# Patient Record
Sex: MALE | Race: WHITE | NOT HISPANIC OR LATINO | URBAN - METROPOLITAN AREA
[De-identification: names, ages, dates, MRNs, and addresses within clinical notes are randomized per-mention and may not be internally consistent; named-entity substitution may affect disease eponyms.]

---

## 2017-11-22 ENCOUNTER — SKIN CHECK (OUTPATIENT)
Dept: URBAN - METROPOLITAN AREA CLINIC 7 | Facility: CLINIC | Age: 67
Setting detail: DERMATOLOGY
End: 2017-11-22

## 2017-11-22 DIAGNOSIS — L57.0 ACTINIC KERATOSIS: ICD-10-CM

## 2017-11-22 PROCEDURE — 17000 DESTRUCT PREMALG LESION: CPT

## 2017-11-22 PROCEDURE — 17003 DESTRUCT PREMALG LES 2-14: CPT

## 2017-11-22 PROCEDURE — 99213 OFFICE O/P EST LOW 20 MIN: CPT

## 2023-12-04 ENCOUNTER — APPOINTMENT (RX ONLY)
Dept: URBAN - METROPOLITAN AREA CLINIC 94 | Facility: CLINIC | Age: 73
Setting detail: DERMATOLOGY
End: 2023-12-04

## 2023-12-04 DIAGNOSIS — Z85.828 PERSONAL HISTORY OF OTHER MALIGNANT NEOPLASM OF SKIN: ICD-10-CM

## 2023-12-04 DIAGNOSIS — L81.4 OTHER MELANIN HYPERPIGMENTATION: ICD-10-CM

## 2023-12-04 DIAGNOSIS — D22 MELANOCYTIC NEVI: ICD-10-CM

## 2023-12-04 DIAGNOSIS — L57.0 ACTINIC KERATOSIS: ICD-10-CM

## 2023-12-04 DIAGNOSIS — L82.1 OTHER SEBORRHEIC KERATOSIS: ICD-10-CM

## 2023-12-04 PROBLEM — D22.62 MELANOCYTIC NEVI OF LEFT UPPER LIMB, INCLUDING SHOULDER: Status: ACTIVE | Noted: 2023-12-04

## 2023-12-04 PROBLEM — D22.72 MELANOCYTIC NEVI OF LEFT LOWER LIMB, INCLUDING HIP: Status: ACTIVE | Noted: 2023-12-04

## 2023-12-04 PROBLEM — D22.5 MELANOCYTIC NEVI OF TRUNK: Status: ACTIVE | Noted: 2023-12-04

## 2023-12-04 PROBLEM — D22.71 MELANOCYTIC NEVI OF RIGHT LOWER LIMB, INCLUDING HIP: Status: ACTIVE | Noted: 2023-12-04

## 2023-12-04 PROBLEM — D22.61 MELANOCYTIC NEVI OF RIGHT UPPER LIMB, INCLUDING SHOULDER: Status: ACTIVE | Noted: 2023-12-04

## 2023-12-04 PROCEDURE — 99214 OFFICE O/P EST MOD 30 MIN: CPT | Mod: 25

## 2023-12-04 PROCEDURE — ? SEPARATE AND IDENTIFIABLE DOCUMENTATION

## 2023-12-04 PROCEDURE — 17003 DESTRUCT PREMALG LES 2-14: CPT

## 2023-12-04 PROCEDURE — ? PRESCRIPTION MEDICATION MANAGEMENT

## 2023-12-04 PROCEDURE — ? LIQUID NITROGEN

## 2023-12-04 PROCEDURE — ? COUNSELING

## 2023-12-04 PROCEDURE — 17000 DESTRUCT PREMALG LESION: CPT

## 2023-12-04 ASSESSMENT — LOCATION ZONE DERM
LOCATION ZONE: TRUNK
LOCATION ZONE: FACE
LOCATION ZONE: ARM
LOCATION ZONE: SCALP
LOCATION ZONE: LEG

## 2023-12-04 ASSESSMENT — LOCATION SIMPLE DESCRIPTION DERM
LOCATION SIMPLE: LEFT PRETIBIAL REGION
LOCATION SIMPLE: LEFT UPPER BACK
LOCATION SIMPLE: LEFT THIGH
LOCATION SIMPLE: ABDOMEN
LOCATION SIMPLE: RIGHT FOREHEAD
LOCATION SIMPLE: CHEST
LOCATION SIMPLE: RIGHT THIGH
LOCATION SIMPLE: LEFT FOREHEAD
LOCATION SIMPLE: RIGHT PRETIBIAL REGION
LOCATION SIMPLE: RIGHT BACK
LOCATION SIMPLE: RIGHT FOREARM
LOCATION SIMPLE: LEFT FOREARM
LOCATION SIMPLE: RIGHT UPPER ARM
LOCATION SIMPLE: LEFT UPPER ARM
LOCATION SIMPLE: POSTERIOR SCALP
LOCATION SIMPLE: RIGHT CHEEK
LOCATION SIMPLE: LEFT CHEEK

## 2023-12-04 ASSESSMENT — LOCATION DETAILED DESCRIPTION DERM
LOCATION DETAILED: LEFT SUPERIOR FOREHEAD
LOCATION DETAILED: RIGHT ANTERIOR DISTAL THIGH
LOCATION DETAILED: RIGHT CENTRAL MALAR CHEEK
LOCATION DETAILED: RIGHT SUPERIOR LATERAL UPPER BACK
LOCATION DETAILED: RIGHT SUPERIOR FOREHEAD
LOCATION DETAILED: EPIGASTRIC SKIN
LOCATION DETAILED: RIGHT INFERIOR LATERAL FOREHEAD
LOCATION DETAILED: LOWER STERNUM
LOCATION DETAILED: LEFT SUPERIOR MEDIAL FOREHEAD
LOCATION DETAILED: LEFT ANTERIOR DISTAL THIGH
LOCATION DETAILED: LEFT FOREHEAD
LOCATION DETAILED: RIGHT ANTERIOR DISTAL UPPER ARM
LOCATION DETAILED: POSTERIOR MID-PARIETAL SCALP
LOCATION DETAILED: LEFT DISTAL DORSAL FOREARM
LOCATION DETAILED: LEFT VENTRAL PROXIMAL FOREARM
LOCATION DETAILED: LEFT SUPERIOR LATERAL UPPER BACK
LOCATION DETAILED: LEFT DISTAL PRETIBIAL REGION
LOCATION DETAILED: LEFT ANTERIOR DISTAL UPPER ARM
LOCATION DETAILED: LEFT SUPERIOR CENTRAL MALAR CHEEK
LOCATION DETAILED: LEFT PROXIMAL DORSAL FOREARM
LOCATION DETAILED: LEFT INFERIOR FOREHEAD
LOCATION DETAILED: RIGHT DISTAL PRETIBIAL REGION
LOCATION DETAILED: RIGHT VENTRAL PROXIMAL FOREARM
LOCATION DETAILED: RIGHT PROXIMAL DORSAL FOREARM

## 2023-12-04 NOTE — PROCEDURE: PRESCRIPTION MEDICATION MANAGEMENT
Render In Strict Bullet Format?: No
Initiate Treatment: Pt to start Fluorouracil cream to forehead and bilateral forearms BID x 2 weeks for forehead and 4 weeks for arms
Detail Level: Zone

## 2023-12-04 NOTE — PROCEDURE: LIQUID NITROGEN
Post-Care Instructions: Patient provided with verbal post-care instructions. Patient is to wear sun protection. Patient may apply Vaseline to crusted or scabbing areas as they heal.
Duration Of Freeze Thaw-Cycle (Seconds): 0
Render Post-Care Instructions In Note?: no
Show Aperture Variable?: Yes
Consent: The patient's consent was obtained and risks, benefits, and alternatives were reviewed.
Detail Level: Simple

## 2023-12-04 NOTE — PROCEDURE: COUNSELING
Detail Level: Zone
Detail Level: Generalized
Detail Level: Detailed
Detail Level: Simple
Patient Specific Counseling (Will Not Stick From Patient To Patient): Recommended pt to use Fluorouracil to forehead and forearms

## 2024-11-04 ENCOUNTER — APPOINTMENT (RX ONLY)
Dept: URBAN - METROPOLITAN AREA CLINIC 94 | Facility: CLINIC | Age: 74
Setting detail: DERMATOLOGY
End: 2024-11-04

## 2024-11-04 DIAGNOSIS — L81.4 OTHER MELANIN HYPERPIGMENTATION: ICD-10-CM

## 2024-11-04 DIAGNOSIS — L57.0 ACTINIC KERATOSIS: ICD-10-CM

## 2024-11-04 DIAGNOSIS — L82.1 OTHER SEBORRHEIC KERATOSIS: ICD-10-CM

## 2024-11-04 DIAGNOSIS — D22 MELANOCYTIC NEVI: ICD-10-CM

## 2024-11-04 DIAGNOSIS — D49.2 NEOPLASM OF UNSPECIFIED BEHAVIOR OF BONE, SOFT TISSUE, AND SKIN: ICD-10-CM | Status: INADEQUATELY CONTROLLED

## 2024-11-04 DIAGNOSIS — Z85.828 PERSONAL HISTORY OF OTHER MALIGNANT NEOPLASM OF SKIN: ICD-10-CM

## 2024-11-04 PROBLEM — D22.5 MELANOCYTIC NEVI OF TRUNK: Status: ACTIVE | Noted: 2024-11-04

## 2024-11-04 PROBLEM — D22.71 MELANOCYTIC NEVI OF RIGHT LOWER LIMB, INCLUDING HIP: Status: ACTIVE | Noted: 2024-11-04

## 2024-11-04 PROBLEM — D22.62 MELANOCYTIC NEVI OF LEFT UPPER LIMB, INCLUDING SHOULDER: Status: ACTIVE | Noted: 2024-11-04

## 2024-11-04 PROBLEM — D22.61 MELANOCYTIC NEVI OF RIGHT UPPER LIMB, INCLUDING SHOULDER: Status: ACTIVE | Noted: 2024-11-04

## 2024-11-04 PROBLEM — D22.72 MELANOCYTIC NEVI OF LEFT LOWER LIMB, INCLUDING HIP: Status: ACTIVE | Noted: 2024-11-04

## 2024-11-04 PROCEDURE — 69100 BIOPSY OF EXTERNAL EAR: CPT | Mod: 59

## 2024-11-04 PROCEDURE — ? BIOPSY BY SHAVE METHOD

## 2024-11-04 PROCEDURE — 11102 TANGNTL BX SKIN SINGLE LES: CPT | Mod: 59

## 2024-11-04 PROCEDURE — 17003 DESTRUCT PREMALG LES 2-14: CPT

## 2024-11-04 PROCEDURE — ? COUNSELING

## 2024-11-04 PROCEDURE — 99213 OFFICE O/P EST LOW 20 MIN: CPT | Mod: 25

## 2024-11-04 PROCEDURE — ? LIQUID NITROGEN

## 2024-11-04 PROCEDURE — 17000 DESTRUCT PREMALG LESION: CPT | Mod: 59

## 2024-11-04 ASSESSMENT — LOCATION DETAILED DESCRIPTION DERM
LOCATION DETAILED: RIGHT ANTERIOR DISTAL UPPER ARM
LOCATION DETAILED: RIGHT CENTRAL MALAR CHEEK
LOCATION DETAILED: RIGHT SUPERIOR LATERAL UPPER BACK
LOCATION DETAILED: LEFT SUPERIOR FOREHEAD
LOCATION DETAILED: LEFT CENTRAL MALAR CHEEK
LOCATION DETAILED: POSTERIOR MID-PARIETAL SCALP
LOCATION DETAILED: LEFT SUPERIOR LATERAL UPPER BACK
LOCATION DETAILED: LEFT VENTRAL PROXIMAL FOREARM
LOCATION DETAILED: RIGHT SUPERIOR FOREHEAD
LOCATION DETAILED: RIGHT SUPERIOR POSTERIOR HELIX
LOCATION DETAILED: LEFT FOREHEAD
LOCATION DETAILED: EPIGASTRIC SKIN
LOCATION DETAILED: RIGHT ANTERIOR DISTAL THIGH
LOCATION DETAILED: LEFT ANTERIOR DISTAL THIGH
LOCATION DETAILED: LOWER STERNUM
LOCATION DETAILED: RIGHT VENTRAL PROXIMAL FOREARM
LOCATION DETAILED: RIGHT DISTAL PRETIBIAL REGION
LOCATION DETAILED: LEFT DISTAL PRETIBIAL REGION
LOCATION DETAILED: LEFT ANTERIOR DISTAL UPPER ARM

## 2024-11-04 ASSESSMENT — LOCATION SIMPLE DESCRIPTION DERM
LOCATION SIMPLE: LEFT UPPER ARM
LOCATION SIMPLE: POSTERIOR SCALP
LOCATION SIMPLE: ABDOMEN
LOCATION SIMPLE: LEFT THIGH
LOCATION SIMPLE: RIGHT CHEEK
LOCATION SIMPLE: LEFT PRETIBIAL REGION
LOCATION SIMPLE: RIGHT FOREARM
LOCATION SIMPLE: LEFT CHEEK
LOCATION SIMPLE: RIGHT PRETIBIAL REGION
LOCATION SIMPLE: RIGHT UPPER ARM
LOCATION SIMPLE: RIGHT EAR
LOCATION SIMPLE: CHEST
LOCATION SIMPLE: LEFT FOREHEAD
LOCATION SIMPLE: LEFT UPPER BACK
LOCATION SIMPLE: RIGHT BACK
LOCATION SIMPLE: RIGHT THIGH
LOCATION SIMPLE: LEFT FOREARM
LOCATION SIMPLE: RIGHT FOREHEAD

## 2024-11-04 ASSESSMENT — LOCATION ZONE DERM
LOCATION ZONE: TRUNK
LOCATION ZONE: EAR
LOCATION ZONE: SCALP
LOCATION ZONE: LEG
LOCATION ZONE: FACE
LOCATION ZONE: ARM

## 2024-11-04 NOTE — PROCEDURE: LIQUID NITROGEN
Consent: The patient's consent was obtained and risks, benefits, and alternatives were reviewed.
Show Aperture Variable?: Yes
Render Post-Care Instructions In Note?: no
Duration Of Freeze Thaw-Cycle (Seconds): 0
Post-Care Instructions: Patient provided with verbal post-care instructions. Patient is to wear sun protection. Patient may apply Vaseline to crusted or scabbing areas as they heal.
Detail Level: Simple

## 2024-11-04 NOTE — PROCEDURE: BIOPSY BY SHAVE METHOD
Detail Level: Detailed
Depth Of Biopsy: dermis
Was A Bandage Applied: Yes
Size Of Lesion In Cm: 0
Biopsy Type: H and E
Biopsy Method: double edge Personna blade
Anesthesia Type: 1% lidocaine without epinephrine
Anesthesia Volume In Cc: 0.5
Hemostasis: Electrocautery and Aluminum Chloride
Wound Care: Petrolatum
Dressing: bandage
Destruction After The Procedure: No
Type Of Destruction Used: Curettage
Curettage Text: The wound bed was treated with curettage after the biopsy was performed.
Cryotherapy Text: The wound bed was treated with cryotherapy after the biopsy was performed.
Electrodesiccation Text: The wound bed was treated with electrodesiccation after the biopsy was performed.
Electrodesiccation And Curettage Text: The wound bed was treated with electrodesiccation and curettage after the biopsy was performed.
Silver Nitrate Text: The wound bed was treated with silver nitrate after the biopsy was performed.
Lab: [id number]
Consent was obtained and risks, benefits, and alternatives were reviewed.
Post-Care Instructions: Patient is to keep the biopsy covered with Vaseline/Aquaphor and cover with bandage daily until healed. Call the office if any concerns about healing process.
Notification Instructions: Patient will be notified of biopsy results.
Billing Type: Third-Party Bill
Information: Selecting Yes will display possible errors in your note based on the variables you have selected. This validation is only offered as a suggestion for you. PLEASE NOTE THAT THE VALIDATION TEXT WILL BE REMOVED WHEN YOU FINALIZE YOUR NOTE. IF YOU WANT TO FAX A PRELIMINARY NOTE YOU WILL NEED TO TOGGLE THIS TO 'NO' IF YOU DO NOT WANT IT IN YOUR FAXED NOTE.

## 2024-12-02 ENCOUNTER — APPOINTMENT (RX ONLY)
Dept: URBAN - METROPOLITAN AREA CLINIC 102 | Facility: CLINIC | Age: 74
Setting detail: DERMATOLOGY
End: 2024-12-02

## 2024-12-02 PROBLEM — C44.212 BASAL CELL CARCINOMA OF SKIN OF RIGHT EAR AND EXTERNAL AURICULAR CANAL: Status: ACTIVE | Noted: 2024-12-02

## 2024-12-02 PROCEDURE — 99214 OFFICE O/P EST MOD 30 MIN: CPT

## 2024-12-02 PROCEDURE — ? CONSULTATION FOR ELECTRON BEAM THERAPY

## 2024-12-02 PROCEDURE — ? PATIENT SPECIFIC COUNSELING

## 2024-12-02 NOTE — HPI: BIOPSY PROVEN BCC
How Severe Is Your Bcc?: moderate
When Was The Bcc Biopsied? (Optional): 11/04/2024
Accession # (Optional): [id number]

## 2024-12-02 NOTE — PROCEDURE: CONSULTATION FOR ELECTRON BEAM THERAPY
Right Lower Extremity: No
Size Of Lesion: 1.1
Referring Provider: Dali Miller MD
Other Plan: We discussed a 5 to 6 week treatment plan, Pt selected a 5 week regimen 
X Size Of Lesion In Cm (Optional): 0.9
Anatomic Location From Referring Provider: right superior posterior helix
Detail Level: Detailed

## 2024-12-19 ENCOUNTER — APPOINTMENT (OUTPATIENT)
Dept: URBAN - METROPOLITAN AREA CLINIC 102 | Facility: CLINIC | Age: 74
Setting detail: DERMATOLOGY
End: 2024-12-19

## 2024-12-19 PROBLEM — D04.71 CARCINOMA IN SITU OF SKIN OF RIGHT LOWER LIMB, INCLUDING HIP: Status: ACTIVE | Noted: 2024-12-19

## 2024-12-19 PROCEDURE — 17262 DSTRJ MAL LES T/A/L 1.1-2.0: CPT

## 2024-12-19 PROCEDURE — ? CURETTAGE AND DESTRUCTION

## 2024-12-19 NOTE — PROCEDURE: CURETTAGE AND DESTRUCTION
Body Location Override (Optional - Billing Will Still Be Based On Selected Body Map Location If Applicable): right anterior distal thigh
Detail Level: Simple
Number Of Curettages: 1
Size Of Lesion In Cm: 0.7
Size Of Lesion After Curettage: 1.4
Add Intralesional Injection: No
Anesthesia Type: 1% lidocaine without epinephrine
Anesthesia Volume In Cc: 2
Cautery Type: electrodesiccation
What Was Performed First?: Curettage
Final Size Statement: The size of the lesion after curettage was
Additional Information: (Optional): The wound was cleaned, and a pressure dressing was applied.  The patient received detailed post-op instructions.
Consent was obtained from the patient. The risks, benefits and alternatives to therapy were discussed in detail.
Render Post-Care Instructions In Note?: yes
Post-Care Instructions: Patient received post-care instructions. Patient instructed to call the office if any concerns during the healing process.
Bill As A Line Item Or As Units: Line Item

## 2025-01-06 ENCOUNTER — APPOINTMENT (OUTPATIENT)
Dept: URBAN - METROPOLITAN AREA CLINIC 102 | Facility: CLINIC | Age: 75
Setting detail: DERMATOLOGY
End: 2025-01-06

## 2025-01-06 PROBLEM — C44.212 BASAL CELL CARCINOMA OF SKIN OF RIGHT EAR AND EXTERNAL AURICULAR CANAL: Status: ACTIVE | Noted: 2025-01-06

## 2025-01-06 PROCEDURE — ? INITIAL COMPLEX SIMULATION

## 2025-01-06 PROCEDURE — 77263 THER RADIOLOGY TX PLNG CPLX: CPT

## 2025-01-06 PROCEDURE — ? CLINICAL TREATMENT PLAN FOR RADIATION THERAPY

## 2025-01-06 PROCEDURE — 77290 THER RAD SIMULAJ FIELD CPLX: CPT

## 2025-01-06 NOTE — PROCEDURE: CLINICAL TREATMENT PLAN FOR RADIATION THERAPY
Intro Statement (Will Not Render If Left Blank): Records, reports, pathology, and physical exam have been completed, interpreted and reviewed to assist in defining the course of radiation therapy treatment. Parameters interpreted include tumor histology, size, location, extent of disease and prior medical history. These factors will integrate into radiation field design. A complex electron beam simulation is necessary to accomplish a reproducible beam arrangement, field size and target volume with which to treat the tumor. Beam modifying devices will be designed and utilized as necessary to optimize the treatment and to best spare normal tissues. Complex blocking will be performed to minimize radiation scatter (penumbra), shape to target volume, and to best protect adjacent and underlying normal tissues. Fields will be verified on the patient prior to radiation delivery.
Daily Dose (Include Units): 250cGy
Pathology: Use Selected EMA Impression
Cummulative Dose (Include Units): 5500cGy
Which Code: 65801
Treatment Length (Include Units): 5 weeks
Send Cpt Codes To Pm?: Yes
Detail Level: Detailed
Dosing Schedule: 5 days a week
Position: supine
Energy In Mev: 6

## 2025-01-06 NOTE — PROCEDURE: INITIAL COMPLEX SIMULATION
Isodose: 90
Intro Statement For Treatment Plan (Will Not Render If Left Blank): Records, reports, pathology, and physical exam have been completed, interpreted and reviewed to assist in defining the course of radiation therapy treatment. Parameters interpreted include tumor histology, size, location, extent of disease and prior medical history. These factors will integrate into radiation field design. A complex electron beam simulation is necessary to accomplish a reproducible beam arrangement, field size and target volume with which to treat the tumor. Beam modifying devices will be designed and utilized as necessary to optimize the treatment and to best spare normal tissues. Complex blocking will be performed to minimize radiation scatter (penumbra), shape to target volume, and to best protect adjacent and underlying normal tissues. Fields will be verified on the patient prior to radiation delivery.
Detail Level: Detailed
Daily Dose (Include Units): 250cGy
Custom Bolus Type: custom mixed, molded, and shaped
Dosing Schedule: 5 days a week
Pathology: Use Selected EMA Impression
Eye Shield Statement (Will Not Render If Left Blank): External eye shields will be placed daily to limit scatter dose to the lenses of the eyes. Customized eye shielding is safer than reusable eye shielding.  We therefore will custom design and fabricate bilateral eye shields made of shaped lead and covered by wax, for each of our patients. These will be used only during the course of treatment and then destroyed and constitute a complex fabrication process and device.
Intro Statement (Will Not Render If Left Blank): To best access the lesion the ear was taped up superiorly over lead wax shielding. I then designed a radiation field to include the gross lesion (GTV) with additional margin that accounted for both potential subclinical microscopic extension (CTV), as well as for the beam characteristics of superficial electrons (PTV). Care was taken in designing the field near adjacent normal tissue structures. The target volume was drawn on the skin surface with a permanent marker and then the design with reference marks was carefully traced onto an acetate template. Digital photographs were taken.
Position: supine
Use Custom Eyeshields: Yes
Send Clinical Treatment Planning Code To Pm?: No - Documentation Only
Closing Statement (Will Not Render If Left Blank): The patient tolerated the simulation well and appears to have had all of their questions answered to their satisfaction. The patient will return for field verification, simple simulation before radiation is delivered to confirm patient positioning and block shaping and positioning.
Bolus Thickness In Cm: 0.5
Acetate Template Statement (Will Not Render If Left Blank): An acetate template will be used to fabricate a custom lead shielding device to allow for lead on skin collimation. This type of shielding will best limit beam penumbra. Additional shielding will also be added to protect adjacent strutures.
Energy In Mev: 6
Treatment Length (Include Units): 5 weeks
Cummulative Dose (Include Units): 5500cGy

## 2025-01-09 ENCOUNTER — APPOINTMENT (OUTPATIENT)
Dept: URBAN - METROPOLITAN AREA CLINIC 102 | Facility: CLINIC | Age: 75
Setting detail: DERMATOLOGY
End: 2025-01-09

## 2025-01-09 PROBLEM — C44.212 BASAL CELL CARCINOMA OF SKIN OF RIGHT EAR AND EXTERNAL AURICULAR CANAL: Status: ACTIVE | Noted: 2025-01-09

## 2025-01-09 PROCEDURE — ? SIMPLE SIMULATION - BLOCK CHECK

## 2025-01-09 PROCEDURE — 77280 THER RAD SIMULAJ FIELD SMPL: CPT

## 2025-01-09 NOTE — PROCEDURE: SIMPLE SIMULATION - BLOCK CHECK
Closing Statement (Will Not Render If Left Blank): Working with the physician, the therapist adjusted the machine gantry, table, and collimator and adjusted patient positioning to allow an appositional electron beam. SSD measurements were verified using the projected optical distance indicator light and room lasers. The field was positioned at 100cm SSD to the top of the bolus material. The machine parameters were recorded. The treatment light field was photographed projected onto the patient's skin. Further digital photographs were taken and will be used as a reference. The patient tolerated the simple simulation well and appears to have had all of their questions answered to their satisfaction.
Position: supine
Detail Level: Simple
Custom Bolus Type: custom mixed, molded, and shaped
Bolus Thickness In Cm: Next 0.5

## 2025-01-16 ENCOUNTER — APPOINTMENT (OUTPATIENT)
Dept: URBAN - METROPOLITAN AREA CLINIC 102 | Facility: CLINIC | Age: 75
Setting detail: DERMATOLOGY
End: 2025-01-16

## 2025-01-16 PROBLEM — C44.212 BASAL CELL CARCINOMA OF SKIN OF RIGHT EAR AND EXTERNAL AURICULAR CANAL: Status: ACTIVE | Noted: 2025-01-16

## 2025-01-16 PROCEDURE — ? ELECTRON BEAM THERAPY

## 2025-01-16 NOTE — PROCEDURE: ELECTRON BEAM THERAPY
Ebt Cpt Code (Please Add Code Details Below): 
Treatment Margins In Cm: 0
Is Therapy Completed?: No
Date Of Fraction 1: 01/16/2025
Date Of Fraction 3: 01/20/2025
Date Of Fraction 5: 01/22/2025
Radiation Units: cGy
Detail Level: Detailed
Total Planned Fractions: 22
Date Of Fraction 4: 01/21/2025
Location Override (Location Will Render As Ema Body Location If Left Blank): right superior posterior helix
Assessment: Appropriate reaction
Early, Moist Desquamation Counseling: The patient was instructed in meticulous wound care and counseled on potential and expected side effects of treatment and reasonable expectations for the time to heal. They appeared to have all of their questions answered to their satisfaction. They were recommended to cleanse the treatment site with dilute hydrogen peroxide and water (using 50:50 ratio). They were told how to apply the solution gently with a cotton ball twice daily. After cleaning, they were instructed to pat the area dry with a soft cloth and then apply a small amount of Silvadene 1% cream twice daily. They were told to return to the clinic in 7 days for re-evaluation. The patient understands to call with any questions, concerns or difficulties. They were informed of the potentital for infection and counseled on common signs and symptoms of infection including skin redness extending outside of the treatment area, enlargement or skin breakdown, significant warmth, pain, fever or chills or sweats. They voiced an understanding to contact us immediately if any of these symptoms develop. Their follow up appointment was scheduled. They were also instructed to follow-up with dermatology for skin cancer surveillance.
Mild, Moderate, Brisk Erythema Or Dry Desquamation Counseling: The patient was instructed in meticulous wound care and counseled on potential and expected side effects of treatment and reasonable expectations for the time to heal. They appeared to have all of their questions answered to their satisfaction. They were recommended to rinse the treatment site with mild soap and water (recommended Dove, Neutrogena or Cetaphil). After rinsing the treatment site twice a day they are to apply a pea-sized amount of Aquaphor healing ointment twice daily. Aquaphor samples were provided. The patient understands to call with any questions, concerns or difficulties. They were informed of the potentital for infection and counseled on common signs and symptoms of infection including skin redness extending outside of the treatment area, enlargement or skin breakdown, significant warmth, pain, fever or chills or sweats. They voiced an understanding to contact us immediately if any of these symptoms develop. Their follow up appointment was scheduled. They were also instructed to follow-up with dermatology for skin cancer surveillance.
Date Of Fraction 2: 01/17/2025
Total Rx Dosage: 5500
Daily Dosage Administered: 250

## 2025-01-23 ENCOUNTER — APPOINTMENT (OUTPATIENT)
Dept: URBAN - METROPOLITAN AREA CLINIC 102 | Facility: CLINIC | Age: 75
Setting detail: DERMATOLOGY
End: 2025-01-23

## 2025-01-23 PROBLEM — C44.212 BASAL CELL CARCINOMA OF SKIN OF RIGHT EAR AND EXTERNAL AURICULAR CANAL: Status: ACTIVE | Noted: 2025-01-23

## 2025-01-23 PROCEDURE — ? COMPLEX SIMULATION - REDUCED FIELD

## 2025-01-23 PROCEDURE — 77290 THER RAD SIMULAJ FIELD CPLX: CPT

## 2025-01-23 PROCEDURE — ? ELECTRON BEAM THERAPY

## 2025-01-23 NOTE — PROCEDURE: ELECTRON BEAM THERAPY
Date Of Fraction 6: 01/23/2025
Ebt Cpt Code (Please Add Code Details Below): 
Treatment Margins In Cm: 0
Is Therapy Completed?: No
Date Of Fraction 1: 01/16/2025
Date Of Fraction 9: 01/28/2025
Date Of Fraction 7: 01/24/2025
Date Of Fraction 3: 01/20/2025
Date Of Fraction 5: 01/22/2025
Date Of Fraction 10: 01/29/2025
Radiation Units: cGy
Detail Level: Detailed
Total Planned Fractions: 22
Date Of Fraction 4: 01/21/2025
Location Override (Location Will Render As Ema Body Location If Left Blank): right superior posterior helix
Assessment: Appropriate reaction
Early, Moist Desquamation Counseling: The patient was instructed in meticulous wound care and counseled on potential and expected side effects of treatment and reasonable expectations for the time to heal. They appeared to have all of their questions answered to their satisfaction. They were recommended to cleanse the treatment site with dilute hydrogen peroxide and water (using 50:50 ratio). They were told how to apply the solution gently with a cotton ball twice daily. After cleaning, they were instructed to pat the area dry with a soft cloth and then apply a small amount of Silvadene 1% cream twice daily. They were told to return to the clinic in 7 days for re-evaluation. The patient understands to call with any questions, concerns or difficulties. They were informed of the potentital for infection and counseled on common signs and symptoms of infection including skin redness extending outside of the treatment area, enlargement or skin breakdown, significant warmth, pain, fever or chills or sweats. They voiced an understanding to contact us immediately if any of these symptoms develop. Their follow up appointment was scheduled. They were also instructed to follow-up with dermatology for skin cancer surveillance.
Date Of Fraction 8: 01/27/2025
Mild, Moderate, Brisk Erythema Or Dry Desquamation Counseling: The patient was instructed in meticulous wound care and counseled on potential and expected side effects of treatment and reasonable expectations for the time to heal. They appeared to have all of their questions answered to their satisfaction. They were recommended to rinse the treatment site with mild soap and water (recommended Dove, Neutrogena or Cetaphil). After rinsing the treatment site twice a day they are to apply a pea-sized amount of Aquaphor healing ointment twice daily. Aquaphor samples were provided. The patient understands to call with any questions, concerns or difficulties. They were informed of the potentital for infection and counseled on common signs and symptoms of infection including skin redness extending outside of the treatment area, enlargement or skin breakdown, significant warmth, pain, fever or chills or sweats. They voiced an understanding to contact us immediately if any of these symptoms develop. Their follow up appointment was scheduled. They were also instructed to follow-up with dermatology for skin cancer surveillance.
Date Of Fraction 2: 01/17/2025
Total Rx Dosage: 5500
Daily Dosage Administered: 250

## 2025-01-23 NOTE — PROCEDURE: COMPLEX SIMULATION - REDUCED FIELD
Intro Statement (Will Not Render If Left Blank): A new radiation electron beam field was designed to include the gross lesion (GTV) with additional margin that accounted for both potential subclinical microscopic extension (CTV), as well as for the beam characteristics of superficial electrons (PTV). This field took into account the changes in the volume of the tumor that have occurred during radiation therapy. Care was taken in designing the field near adjacent normal tissue structures. The target volume was drawn on the skin surface with a permanent marker and then the design with reference marks was carefully traced onto an acetate template. Digital photographs were taken.
Position: supine
Energy In Mev: 6
Pathology: Use Selected EMA Impression
Custom Bolus Type: custom mixed, molded, and shaped
Isodose: 90
Acetate Template Statement (Will Not Render If Left Blank): The acetate template will be used to fabricate a custom lead on skin shielding device to allow for lead on skin collimation. This type of shielding will best limit beam penumbra and define the field.
Bolus Thickness In Cm: .5
Detail Level: Simple
Closing Statement (Will Not Render If Left Blank): The patient tolerated the complex electron beam simulation well and will continue on radiotherapy using the modified field. The patient will return for a field verification simulation before radiation is delivered to confirm patient positioning and block fabrication parameters.

## 2025-01-30 ENCOUNTER — APPOINTMENT (OUTPATIENT)
Dept: URBAN - METROPOLITAN AREA CLINIC 102 | Facility: CLINIC | Age: 75
Setting detail: DERMATOLOGY
End: 2025-01-30

## 2025-01-30 ENCOUNTER — APPOINTMENT (OUTPATIENT)
Dept: URBAN - METROPOLITAN AREA CLINIC 146 | Facility: CLINIC | Age: 75
Setting detail: DERMATOLOGY
End: 2025-01-30

## 2025-01-30 PROBLEM — C44.212 BASAL CELL CARCINOMA OF SKIN OF RIGHT EAR AND EXTERNAL AURICULAR CANAL: Status: ACTIVE | Noted: 2025-01-30

## 2025-01-30 PROCEDURE — ? SIMPLE SIMULATION - BLOCK CHECK

## 2025-01-30 PROCEDURE — 77280 THER RAD SIMULAJ FIELD SMPL: CPT

## 2025-01-30 PROCEDURE — ? ELECTRON BEAM THERAPY

## 2025-01-30 NOTE — PROCEDURE: ELECTRON BEAM THERAPY
Date Of Fraction 13: 02/03/2025
Date Of Fraction 6: 01/23/2025
Ebt Cpt Code (Please Add Code Details Below): 
Treatment Margins In Cm: 0
Is Therapy Completed?: No
Date Of Fraction 1: 01/16/2025
Date Of Fraction 9: 01/28/2025
Date Of Fraction 7: 01/24/2025
Date Of Fraction 3: 01/20/2025
Date Of Fraction 5: 01/22/2025
Date Of Fraction 10: 01/29/2025
Date Of Fraction 11: 01/30/2025
Radiation Units: cGy
Date Of Fraction 12: 01/31/2025
Detail Level: Detailed
Total Planned Fractions: 22
Date Of Fraction 4: 01/21/2025
Location Override (Location Will Render As Ema Body Location If Left Blank): right superior posterior helix
Assessment: Appropriate reaction
Date Of Fraction 15: 02/05/2025
Early, Moist Desquamation Counseling: The patient was instructed in meticulous wound care and counseled on potential and expected side effects of treatment and reasonable expectations for the time to heal. They appeared to have all of their questions answered to their satisfaction. They were recommended to cleanse the treatment site with dilute hydrogen peroxide and water (using 50:50 ratio). They were told how to apply the solution gently with a cotton ball twice daily. After cleaning, they were instructed to pat the area dry with a soft cloth and then apply a small amount of Silvadene 1% cream twice daily. They were told to return to the clinic in 7 days for re-evaluation. The patient understands to call with any questions, concerns or difficulties. They were informed of the potentital for infection and counseled on common signs and symptoms of infection including skin redness extending outside of the treatment area, enlargement or skin breakdown, significant warmth, pain, fever or chills or sweats. They voiced an understanding to contact us immediately if any of these symptoms develop. Their follow up appointment was scheduled. They were also instructed to follow-up with dermatology for skin cancer surveillance.
Date Of Fraction 8: 01/27/2025
Mild, Moderate, Brisk Erythema Or Dry Desquamation Counseling: The patient was instructed in meticulous wound care and counseled on potential and expected side effects of treatment and reasonable expectations for the time to heal. They appeared to have all of their questions answered to their satisfaction. They were recommended to rinse the treatment site with mild soap and water (recommended Dove, Neutrogena or Cetaphil). After rinsing the treatment site twice a day they are to apply a pea-sized amount of Aquaphor healing ointment twice daily. Aquaphor samples were provided. The patient understands to call with any questions, concerns or difficulties. They were informed of the potentital for infection and counseled on common signs and symptoms of infection including skin redness extending outside of the treatment area, enlargement or skin breakdown, significant warmth, pain, fever or chills or sweats. They voiced an understanding to contact us immediately if any of these symptoms develop. Their follow up appointment was scheduled. They were also instructed to follow-up with dermatology for skin cancer surveillance.
Date Of Fraction 2: 01/17/2025
Total Rx Dosage: 5500
Daily Dosage Administered: 250
Date Of Fraction 14: 02/04/2025

## 2025-01-30 NOTE — PROCEDURE: SIMPLE SIMULATION - BLOCK CHECK
Detail Level: Simple
Bolus Thickness In Cm: Next 0.8
Closing Statement (Will Not Render If Left Blank): Working with the physician, the therapist adjusted the machine gantry, table, and collimator and adjusted patient positioning to allow an appositional electron beam. SSD measurements were verified using the projected optical distance indicator light and room lasers. The field was positioned at 100cm SSD to the top of the bolus material. The machine parameters were recorded. The treatment light field was photographed projected onto the patient's skin. Further digital photographs were taken and will be used as a reference. The patient tolerated the simple simulation well and appears to have had all of their questions answered to their satisfaction.
Position: supine
Custom Bolus Type: custom mixed, molded, and shaped

## 2025-02-06 ENCOUNTER — APPOINTMENT (OUTPATIENT)
Dept: URBAN - METROPOLITAN AREA CLINIC 102 | Facility: CLINIC | Age: 75
Setting detail: DERMATOLOGY
End: 2025-02-06

## 2025-02-06 PROBLEM — C44.212 BASAL CELL CARCINOMA OF SKIN OF RIGHT EAR AND EXTERNAL AURICULAR CANAL: Status: ACTIVE | Noted: 2025-02-06

## 2025-02-06 PROCEDURE — ? ELECTRON BEAM THERAPY

## 2025-02-06 NOTE — PROCEDURE: ELECTRON BEAM THERAPY
Date Of Fraction 13: 02/03/2025
Date Of Fraction 6: 01/23/2025
Ebt Cpt Code (Please Add Code Details Below): 
Treatment Margins In Cm: 0
Is Therapy Completed?: No
Date Of Fraction 1: 01/16/2025
Date Of Fraction 9: 01/28/2025
Date Of Fraction 18: 02/12/2025
Date Of Fraction 7: 01/24/2025
Date Of Fraction 3: 01/20/2025
Date Of Fraction 5: 01/22/2025
Date Of Fraction 10: 01/29/2025
Date Of Fraction 11: 01/30/2025
Radiation Units: cGy
Date Of Fraction 12: 01/31/2025
Detail Level: Detailed
Total Planned Fractions: 21
Date Of Fraction 4: 01/21/2025
Location Override (Location Will Render As Ema Body Location If Left Blank): right superior posterior helix
Date Of Fraction 20: 02/14/2025
Assessment: Appropriate reaction
Date Of Fraction 15: 02/05/2025
Date Of Fraction 16: 02/06/2025
Early, Moist Desquamation Counseling: The patient was instructed in meticulous wound care and counseled on potential and expected side effects of treatment and reasonable expectations for the time to heal. They appeared to have all of their questions answered to their satisfaction. They were recommended to cleanse the treatment site with dilute hydrogen peroxide and water (using 50:50 ratio). They were told how to apply the solution gently with a cotton ball twice daily. After cleaning, they were instructed to pat the area dry with a soft cloth and then apply a small amount of Silvadene 1% cream twice daily. They were told to return to the clinic in 7 days for re-evaluation. The patient understands to call with any questions, concerns or difficulties. They were informed of the potentital for infection and counseled on common signs and symptoms of infection including skin redness extending outside of the treatment area, enlargement or skin breakdown, significant warmth, pain, fever or chills or sweats. They voiced an understanding to contact us immediately if any of these symptoms develop. Their follow up appointment was scheduled. They were also instructed to follow-up with dermatology for skin cancer surveillance.
Date Of Fraction 8: 01/27/2025
Mild, Moderate, Brisk Erythema Or Dry Desquamation Counseling: The patient was instructed in meticulous wound care and counseled on potential and expected side effects of treatment and reasonable expectations for the time to heal. They appeared to have all of their questions answered to their satisfaction. They were recommended to rinse the treatment site with mild soap and water (recommended Dove, Neutrogena or Cetaphil). After rinsing the treatment site twice a day they are to apply a pea-sized amount of Aquaphor healing ointment twice daily. Aquaphor samples were provided. The patient understands to call with any questions, concerns or difficulties. They were informed of the potentital for infection and counseled on common signs and symptoms of infection including skin redness extending outside of the treatment area, enlargement or skin breakdown, significant warmth, pain, fever or chills or sweats. They voiced an understanding to contact us immediately if any of these symptoms develop. Their follow up appointment was scheduled. They were also instructed to follow-up with dermatology for skin cancer surveillance.
Date Of Fraction 2: 01/17/2025
Date Of Fraction 17: 02/07/2025
Total Rx Dosage: 5250
Daily Dosage Administered: 250
Date Of Fraction 19: 02/13/2025
Date Of Fraction 14: 02/04/2025

## 2025-02-17 ENCOUNTER — APPOINTMENT (OUTPATIENT)
Dept: URBAN - METROPOLITAN AREA CLINIC 102 | Facility: CLINIC | Age: 75
Setting detail: DERMATOLOGY
End: 2025-02-17

## 2025-02-17 PROBLEM — C44.212 BASAL CELL CARCINOMA OF SKIN OF RIGHT EAR AND EXTERNAL AURICULAR CANAL: Status: ACTIVE | Noted: 2025-02-17

## 2025-02-17 PROCEDURE — ? ELECTRON BEAM THERAPY

## 2025-02-17 NOTE — PROCEDURE: ELECTRON BEAM THERAPY
Date Of Fraction 13: 02/03/2025
Date Of Fraction 6: 01/23/2025
Ebt Cpt Code (Please Add Code Details Below): 
Treatment Margins In Cm: 0
Is Therapy Completed?: Yes
Date Of Fraction 1: 01/16/2025
Date Of Fraction 9: 01/28/2025
Date Of Fraction 18: 02/12/2025
Date Of Fraction 7: 01/24/2025
Date Of Fraction 3: 01/20/2025
Date Of Fraction 5: 01/22/2025
Date Of Fraction 10: 01/29/2025
Date Of Fraction 11: 01/30/2025
Radiation Units: cGy
Date Of Fraction 12: 01/31/2025
Lesion Regression?: 100
Detail Level: Detailed
Clinical Recommendations: Skin recommendations for mild, moderate, brisk erythema or dry desquamation
Change Daily Dosage Administered Mid Treatment?: No
Total Planned Fractions: 21
Date Of Fraction 4: 01/21/2025
Location Override (Location Will Render As Ema Body Location If Left Blank): right superior posterior helix
Date Of Fraction 20: 02/14/2025
Assessment: Appropriate reaction
Date Of Fraction 21: 02/17/2025
Date Of Fraction 15: 02/05/2025
Date Of Fraction 16: 02/06/2025
Early, Moist Desquamation Counseling: The patient was instructed in meticulous wound care and counseled on potential and expected side effects of treatment and reasonable expectations for the time to heal. They appeared to have all of their questions answered to their satisfaction. They were recommended to cleanse the treatment site with dilute hydrogen peroxide and water (using 50:50 ratio). They were told how to apply the solution gently with a cotton ball twice daily. After cleaning, they were instructed to pat the area dry with a soft cloth and then apply a small amount of Silvadene 1% cream twice daily. They were told to return to the clinic in 7 days for re-evaluation. The patient understands to call with any questions, concerns or difficulties. They were informed of the potentital for infection and counseled on common signs and symptoms of infection including skin redness extending outside of the treatment area, enlargement or skin breakdown, significant warmth, pain, fever or chills or sweats. They voiced an understanding to contact us immediately if any of these symptoms develop. Their follow up appointment was scheduled. They were also instructed to follow-up with dermatology for skin cancer surveillance.
Date Of Fraction 8: 01/27/2025
Mild, Moderate, Brisk Erythema Or Dry Desquamation Counseling: The patient was instructed in meticulous wound care and counseled on potential and expected side effects of treatment and reasonable expectations for the time to heal. They appeared to have all of their questions answered to their satisfaction. They were recommended to rinse the treatment site with mild soap and water (recommended Dove, Neutrogena or Cetaphil). After rinsing the treatment site twice a day they are to apply a pea-sized amount of Aquaphor healing ointment twice daily. Aquaphor samples were provided. The patient understands to call with any questions, concerns or difficulties. They were informed of the potentital for infection and counseled on common signs and symptoms of infection including skin redness extending outside of the treatment area, enlargement or skin breakdown, significant warmth, pain, fever or chills or sweats. They voiced an understanding to contact us immediately if any of these symptoms develop. Their follow up appointment was scheduled. They were also instructed to follow-up with dermatology for skin cancer surveillance.
Date Of Fraction 2: 01/17/2025
Date Of Fraction 17: 02/07/2025
How Was The Treatment Tolerated?: very well
Total Rx Dosage: 5250
Daily Dosage Administered: 250
Date Of Fraction 19: 02/13/2025
Date Of Fraction 14: 02/04/2025
Skin Reaction?: expected skin reaction

## 2025-02-26 ENCOUNTER — APPOINTMENT (OUTPATIENT)
Dept: URBAN - METROPOLITAN AREA CLINIC 102 | Facility: CLINIC | Age: 75
Setting detail: DERMATOLOGY
End: 2025-02-26